# Patient Record
Sex: FEMALE | Race: BLACK OR AFRICAN AMERICAN | NOT HISPANIC OR LATINO | URBAN - METROPOLITAN AREA
[De-identification: names, ages, dates, MRNs, and addresses within clinical notes are randomized per-mention and may not be internally consistent; named-entity substitution may affect disease eponyms.]

---

## 2024-02-27 ENCOUNTER — EMERGENCY (EMERGENCY)
Facility: HOSPITAL | Age: 24
LOS: 1 days | Discharge: HOME | End: 2024-02-27
Attending: EMERGENCY MEDICINE | Admitting: EMERGENCY MEDICINE
Payer: MEDICAID

## 2024-02-27 VITALS
RESPIRATION RATE: 18 BRPM | TEMPERATURE: 98 F | DIASTOLIC BLOOD PRESSURE: 86 MMHG | SYSTOLIC BLOOD PRESSURE: 128 MMHG | OXYGEN SATURATION: 99 % | HEART RATE: 85 BPM

## 2024-02-27 PROCEDURE — 70450 CT HEAD/BRAIN W/O DYE: CPT | Mod: 26,MC

## 2024-02-27 PROCEDURE — 99284 EMERGENCY DEPT VISIT MOD MDM: CPT

## 2024-02-27 PROCEDURE — 73130 X-RAY EXAM OF HAND: CPT | Mod: 26,RT

## 2024-02-27 RX ORDER — ACETAMINOPHEN 500 MG
650 TABLET ORAL ONCE
Refills: 0 | Status: COMPLETED | OUTPATIENT
Start: 2024-02-27 | End: 2024-02-27

## 2024-02-27 RX ADMIN — Medication 650 MILLIGRAM(S): at 15:59

## 2024-02-27 NOTE — ED PROVIDER NOTE - PATIENT PORTAL LINK FT
You can access the FollowMyHealth Patient Portal offered by [facility] by registering at the following website: [email or web address]. By joining [employer]’s FollowMyHealth portal, you will also be able to view your health information using other applications (apps) compatible with our system.
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
## 2024-02-27 NOTE — ED PROVIDER NOTE - NSFOLLOWUPCLINICS_GEN_ALL_ED_FT
Ophthalmology  Ophthalmology  [address] [place] [address], [facility] [address]  [place] [address]  Phone: [phone]  Fax:

## 2024-02-27 NOTE — ED PROVIDER NOTE - NSFOLLOWUPINSTRUCTIONS_ED_ALL_ED_FT
You were seen in the Emergency Department for contusion.    1) Continue all previously prescribed medications as directed.    2) Follow up with your primary care physician - take copies of your results.    3) Return to the Emergency Department for worsening or persistent symptoms, and/or ANY NEW OR CONCERNING SYMPTOMS.     YOUR TESTING TODAY SHOWS THAT YOUR SYMPTOMS ARE NOT DUE TO ANYTHING LIFE-THREATENING OR DANGEROUS.  YOU HAVE CONTUSIONS WHICH ARE BRUISES TO THE SOFT TISSUE.  AT HOME YOU CAN TAKE IBUPROFEN AND/OR ACETAMINOHPEN AS NEEDED FOR PAIN.  WE ALSO RECOMMEND ICE PACKS ON AND OFF 10 MINUTES AT A TIME.  YOUR CT SCAN SHOWED CALCIFICATION ALONG YOUR LEFT RETINA.  FOLLOW UP WITH OPHTHALMOLOGY FOR THIS FINDING.

## 2024-02-27 NOTE — ED PROVIDER NOTE - PHYSICAL EXAMINATION
CONSTITUTIONAL: Well appearing, awake, alert, and in no apparent distress.  HEAD: normocephalic; +swelling and tenderness over left parietal region; 0.5cm laceration to mucosal aspect of left upper lip; mild swelling to bridge of nose; right parietal scalp with small abrasion without active bleeding  EYES: PERRL, EOMi BL  ENT: oral mucosa moist  CARDIAC: regular rate and rhythm; no murmurs, rubs, or gallops  RESPIRATORY: normal breath sounds, clear to ascultation bilaterally, no rales, rhonchi, wheezing  GASTROINTESTINAL: abdomen nondistended, soft, nontender, no guarding, rebound tenderness  MSK: Spine appears normal, range of motion is not limited; no midline C/T/L/S-spine tenderness; 5th MCP with diffuse swelling and tenderness  NEURO: Alert and oriented, no focal deficits, no motor or sensory deficits. 5/5 strength x 4; gait stable; normal sensation throughout   SKIN: See HEAD, otherwise, Skin normal color for race, warm, dry and intact. No evidence of rash.  PSYCH: appropriate mood and affect CONSTITUTIONAL: Well appearing, awake, alert, and in no apparent distress.  HEAD: normocephalic; +swelling and tenderness over left parietal region; <0.5cm laceration to mucosal aspect of left upper lip, not gaping, not actively bleeding; mild swelling to bridge of nose; right parietal scalp with small abrasion without active bleeding  EYES: PERRL, EOMi BL  ENT: oral mucosa moist  CARDIAC: regular rate and rhythm; no murmurs, rubs, or gallops  RESPIRATORY: normal breath sounds, clear to ascultation bilaterally, no rales, rhonchi, wheezing  GASTROINTESTINAL: abdomen nondistended, soft, nontender, no guarding, rebound tenderness  MSK: Spine appears normal, range of motion is not limited; no midline C/T/L/S-spine tenderness; 5th MCP with diffuse swelling and tenderness  NEURO: Alert and oriented, no focal deficits, no motor or sensory deficits. 5/5 strength x 4; gait stable; normal sensation throughout   SKIN: See HEAD, otherwise, Skin normal color for race, warm, dry and intact. No evidence of rash.  PSYCH: appropriate mood and affect
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
## 2024-02-27 NOTE — ED PROVIDER NOTE - PROGRESS NOTE DETAILS
[place] PGY1: patient updated on ct results without acute intracranial pathology.  discussed incidental finding of calcification along retina and plan for ophthalmology consult.  patient agreable. [place] PGY1: discussed with ophthalmology (Oneil Morales) [place] PGY1: discussed with ophthalmology (Oneil Morales) who states patient can follow up outpatient.  will give ophthal pt's phone# and they will call her to schedule appointment. [place] PGY1: patient updated on negative xray results.  discussed plan for d/c, follow up with ophthal.  advised acetaminophen/ibuprofen as needed for pain.  patient understands and agrees. [place] PGY1: patient updated on negative xray results.  discussed suspected punctate foreign body on 3rd digit per xray read, on re-eval nail glue noted on nail surface from acrylic nail that has fallen off, otherwise no open wounds, or foreign body appreciated, no tenderness along finger.  discussed plan for d/c, follow up with ophthal.  advised acetaminophen/ibuprofen as needed for pain.  patient understands and agrees.
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
